# Patient Record
Sex: FEMALE | Race: WHITE | NOT HISPANIC OR LATINO
[De-identification: names, ages, dates, MRNs, and addresses within clinical notes are randomized per-mention and may not be internally consistent; named-entity substitution may affect disease eponyms.]

---

## 2024-10-09 ENCOUNTER — TRANSCRIPTION ENCOUNTER (OUTPATIENT)
Age: 34
End: 2024-10-09

## 2024-10-18 ENCOUNTER — ASOB RESULT (OUTPATIENT)
Age: 34
End: 2024-10-18

## 2024-10-18 ENCOUNTER — APPOINTMENT (OUTPATIENT)
Dept: ANTEPARTUM | Facility: CLINIC | Age: 34
End: 2024-10-18
Payer: COMMERCIAL

## 2024-10-18 PROCEDURE — 76819 FETAL BIOPHYS PROFIL W/O NST: CPT

## 2024-10-18 PROCEDURE — 76816 OB US FOLLOW-UP PER FETUS: CPT

## 2024-10-24 ENCOUNTER — APPOINTMENT (OUTPATIENT)
Dept: MATERNAL FETAL MEDICINE | Facility: CLINIC | Age: 34
End: 2024-10-24
Payer: COMMERCIAL

## 2024-10-24 ENCOUNTER — ASOB RESULT (OUTPATIENT)
Age: 34
End: 2024-10-24

## 2024-10-24 PROCEDURE — G0108 DIAB MANAGE TRN  PER INDIV: CPT | Mod: 95

## 2024-11-08 ENCOUNTER — INPATIENT (INPATIENT)
Facility: HOSPITAL | Age: 34
LOS: 2 days | Discharge: HOME | DRG: 951 | End: 2024-11-11
Attending: OBSTETRICS & GYNECOLOGY | Admitting: OBSTETRICS & GYNECOLOGY
Payer: COMMERCIAL

## 2024-11-08 VITALS
SYSTOLIC BLOOD PRESSURE: 143 MMHG | HEART RATE: 74 BPM | OXYGEN SATURATION: 98 % | RESPIRATION RATE: 18 BRPM | DIASTOLIC BLOOD PRESSURE: 69 MMHG

## 2024-11-08 DIAGNOSIS — Z34.80 ENCOUNTER FOR SUPERVISION OF OTHER NORMAL PREGNANCY, UNSPECIFIED TRIMESTER: ICD-10-CM

## 2024-11-08 DIAGNOSIS — O26.899 OTHER SPECIFIED PREGNANCY RELATED CONDITIONS, UNSPECIFIED TRIMESTER: ICD-10-CM

## 2024-11-08 DIAGNOSIS — Z3A.39 39 WEEKS GESTATION OF PREGNANCY: ICD-10-CM

## 2024-11-08 LAB
BASOPHILS # BLD AUTO: 0.02 K/UL — SIGNIFICANT CHANGE UP (ref 0–0.2)
BASOPHILS NFR BLD AUTO: 0.2 % — SIGNIFICANT CHANGE UP (ref 0–2)
BLD GP AB SCN SERPL QL: NEGATIVE — SIGNIFICANT CHANGE UP
EOSINOPHIL # BLD AUTO: 0.07 K/UL — SIGNIFICANT CHANGE UP (ref 0–0.5)
EOSINOPHIL NFR BLD AUTO: 0.6 % — SIGNIFICANT CHANGE UP (ref 0–6)
GLUCOSE BLDC GLUCOMTR-MCNC: 108 MG/DL — HIGH (ref 70–99)
GLUCOSE BLDC GLUCOMTR-MCNC: 116 MG/DL — HIGH (ref 70–99)
GLUCOSE BLDC GLUCOMTR-MCNC: 132 MG/DL — HIGH (ref 70–99)
GLUCOSE BLDC GLUCOMTR-MCNC: 71 MG/DL — SIGNIFICANT CHANGE UP (ref 70–99)
GLUCOSE BLDC GLUCOMTR-MCNC: 86 MG/DL — SIGNIFICANT CHANGE UP (ref 70–99)
GLUCOSE BLDC GLUCOMTR-MCNC: 90 MG/DL — SIGNIFICANT CHANGE UP (ref 70–99)
GLUCOSE BLDC GLUCOMTR-MCNC: 94 MG/DL — SIGNIFICANT CHANGE UP (ref 70–99)
HCT VFR BLD CALC: 36 % — SIGNIFICANT CHANGE UP (ref 34.5–45)
HGB BLD-MCNC: 11.8 G/DL — SIGNIFICANT CHANGE UP (ref 11.5–15.5)
IMM GRANULOCYTES NFR BLD AUTO: 0.6 % — SIGNIFICANT CHANGE UP (ref 0–0.9)
LYMPHOCYTES # BLD AUTO: 1.27 K/UL — SIGNIFICANT CHANGE UP (ref 1–3.3)
LYMPHOCYTES # BLD AUTO: 11.7 % — LOW (ref 13–44)
MCHC RBC-ENTMCNC: 30.7 PG — SIGNIFICANT CHANGE UP (ref 27–34)
MCHC RBC-ENTMCNC: 32.8 G/DL — SIGNIFICANT CHANGE UP (ref 32–36)
MCV RBC AUTO: 93.8 FL — SIGNIFICANT CHANGE UP (ref 80–100)
MONOCYTES # BLD AUTO: 0.65 K/UL — SIGNIFICANT CHANGE UP (ref 0–0.9)
MONOCYTES NFR BLD AUTO: 6 % — SIGNIFICANT CHANGE UP (ref 2–14)
NEUTROPHILS # BLD AUTO: 8.82 K/UL — HIGH (ref 1.8–7.4)
NEUTROPHILS NFR BLD AUTO: 80.9 % — HIGH (ref 43–77)
NRBC # BLD: 0 /100 WBCS — SIGNIFICANT CHANGE UP (ref 0–0)
PLATELET # BLD AUTO: 195 K/UL — SIGNIFICANT CHANGE UP (ref 150–400)
RBC # BLD: 3.84 M/UL — SIGNIFICANT CHANGE UP (ref 3.8–5.2)
RBC # FLD: 12.2 % — SIGNIFICANT CHANGE UP (ref 10.3–14.5)
RH IG SCN BLD-IMP: POSITIVE — SIGNIFICANT CHANGE UP
RH IG SCN BLD-IMP: POSITIVE — SIGNIFICANT CHANGE UP
T PALLIDUM AB TITR SER: NEGATIVE — SIGNIFICANT CHANGE UP
WBC # BLD: 10.89 K/UL — HIGH (ref 3.8–10.5)
WBC # FLD AUTO: 10.89 K/UL — HIGH (ref 3.8–10.5)

## 2024-11-08 RX ORDER — CHLORHEXIDINE GLUCONATE 40 MG/ML
1 SOLUTION TOPICAL DAILY
Refills: 0 | Status: DISCONTINUED | OUTPATIENT
Start: 2024-11-08 | End: 2024-11-09

## 2024-11-08 RX ORDER — AMPICILLIN 2 G/1
2 INJECTION, POWDER, FOR SOLUTION INTRAVENOUS ONCE
Refills: 0 | Status: COMPLETED | OUTPATIENT
Start: 2024-11-08 | End: 2024-11-08

## 2024-11-08 RX ORDER — CITRIC ACID/SODIUM CITRATE 334-500MG
15 SOLUTION, ORAL ORAL EVERY 6 HOURS
Refills: 0 | Status: DISCONTINUED | OUTPATIENT
Start: 2024-11-08 | End: 2024-11-09

## 2024-11-08 RX ORDER — OXYTOCIN IN D5W-0.2% SODIUM CL 15/250 ML
167 PLASTIC BAG, INJECTION (ML) INTRAVENOUS
Qty: 30 | Refills: 0 | Status: DISCONTINUED | OUTPATIENT
Start: 2024-11-08 | End: 2024-11-11

## 2024-11-08 RX ORDER — SODIUM CHLORIDE 9 MG/ML
1000 INJECTION, SOLUTION INTRAMUSCULAR; INTRAVENOUS; SUBCUTANEOUS
Refills: 0 | Status: DISCONTINUED | OUTPATIENT
Start: 2024-11-08 | End: 2024-11-09

## 2024-11-08 RX ORDER — OXYTOCIN IN D5W-0.2% SODIUM CL 15/250 ML
4 PLASTIC BAG, INJECTION (ML) INTRAVENOUS
Qty: 30 | Refills: 0 | Status: DISCONTINUED | OUTPATIENT
Start: 2024-11-08 | End: 2024-11-09

## 2024-11-08 RX ORDER — AMPICILLIN 2 G/1
1 INJECTION, POWDER, FOR SOLUTION INTRAVENOUS EVERY 4 HOURS
Refills: 0 | Status: DISCONTINUED | OUTPATIENT
Start: 2024-11-08 | End: 2024-11-09

## 2024-11-08 RX ADMIN — Medication 125 MILLILITER(S): at 05:01

## 2024-11-08 RX ADMIN — Medication 4 MILLIUNIT(S)/MIN: at 05:29

## 2024-11-08 RX ADMIN — Medication 1000 MILLILITER(S): at 09:20

## 2024-11-08 RX ADMIN — AMPICILLIN 108 GRAM(S): 2 INJECTION, POWDER, FOR SOLUTION INTRAVENOUS at 12:44

## 2024-11-08 RX ADMIN — AMPICILLIN 200 GRAM(S): 2 INJECTION, POWDER, FOR SOLUTION INTRAVENOUS at 08:45

## 2024-11-08 RX ADMIN — AMPICILLIN 108 GRAM(S): 2 INJECTION, POWDER, FOR SOLUTION INTRAVENOUS at 20:54

## 2024-11-08 RX ADMIN — AMPICILLIN 108 GRAM(S): 2 INJECTION, POWDER, FOR SOLUTION INTRAVENOUS at 16:45

## 2024-11-08 NOTE — OB PROVIDER H&P - NS_OBGYNHISTORY_OBGYN_ALL_OB_FT
– PNC: GDMA1, diet controlled. Fingersticks in normal range at home. GBS positive. EFW 3150g extrapolated from sono in office 3 weeks ago.   – OBHx: Primigravida. Denies ectopic pregnancies, terminations, miscarriages.  – GynHx: Denies fibroids, ovarian cysts, abnormal pap smears, STI

## 2024-11-08 NOTE — OB PROVIDER H&P - NS_FETALPRESSONO_OBGYN_ALL_OB
Would NOT use the antibiotic at home as this is likely to be viral.  If worsening or not improving after 7-10 days, please let us know.  For the eyes, would use the eye drops every 4 hours while awake for 5 days.  
Cephalic

## 2024-11-08 NOTE — OB PROVIDER H&P - HISTORY OF PRESENT ILLNESS
PA Admission H&P    Subjective  HPI: 34F  39.1wks gestational age presents for ROR. Pt states feeling a gush of fluid at 2:50am this morning and +FM. -CTXs. -VB. Pt denies any chest pain, palpitations, SOB, headaches, visual disturbances, RUQ pain, or any other concerns.    – PNC: GDMA1, diet controlled. Fingersticks in normal range at home. GBS positive. EFW 3150g extrapolated from sono in office 3 weeks ago.   – OBHx: Primigravida. Denies ectopic pregnancies, terminations, miscarriages.  – GynHx: Denies fibroids, ovarian cysts, abnormal pap smears, STI  – PMH: Denies asthma, thyroid disorders, renal/liver disease, bleeding/clotting disorders  – PSH: Open appendectomy.   – Psych: Denies anxiety, depression  – Social: Denies T/E/D  – Meds: PNV  – Allergies: NKDA  – Will accept blood transfusions? Yes

## 2024-11-08 NOTE — OB PROVIDER H&P - ASSESSMENT
Assessment  34F  39.1wks gestational age presents for ROR. Assessment  34F  39.1wks gestational age admitted for IOL for PROM at 2:50am.

## 2024-11-08 NOTE — OB PROVIDER H&P - NSLOWPPHRISK_OBGYN_A_OB
No previous uterine incision/[place] Pregnancy/Less than or equal to 4 previous vaginal births/No known bleeding disorder

## 2024-11-08 NOTE — OB PROVIDER H&P - NSHPREVIEWOFSYSTEMS_GEN_ALL_CORE
Pt states feeling a gush of fluid at 2:50am this morning and +FM. -CTXs. -VB. Pt denies any chest pain, palpitations, SOB, headaches, visual disturbances, RUQ pain, or any other concerns.

## 2024-11-08 NOTE — PRE-ANESTHESIA EVALUATION ADULT - NSANTHPMHFT_GEN_ALL_CORE
Gestational DM - diet controlled  35 y/o female in labor with ruptured membranes requesting epidural

## 2024-11-08 NOTE — OB PROVIDER H&P - PRO PRENATAL LABS ORI SOURCE VDRL/RPR
Abdomen soft, non-tender and non-distended, no rebound, no guarding and no masses. no hepatosplenomegaly. hard copy, drawn during this pregnancy

## 2024-11-08 NOTE — OB PROVIDER H&P - NSHPPHYSICALEXAM_GEN_ALL_CORE
Objective  – VS  T(C): 37.1 (11-08-24 @ 04:43)  HR: 70 (11-08-24 @ 05:07)  BP: 134/73 (11-08-24 @ 05:07)  RR: 18 (11-08-24 @ 04:43)  SpO2: 99% (11-08-24 @ 05:06)  – PE:   General: NAD  CV: RRR  Pulm: breathing comfortably on RA, clear to auscultation b/l  Abd: gravid, nontender  Extr: moving all extremities with ease, nonedematous, non-TTP of b/l LE  – VE: 1/75/-3, grossly ruptured with meconium, performed by Dr. Roy  – FHT: 135/moderate variability/+accels/-decels  – [place]: irregular contractions   – EFW: 3150g extrapolated from sono in office 3 weeks ago.  – Sono: vertex

## 2024-11-08 NOTE — OB PROVIDER H&P - PROBLEM SELECTOR PLAN 1
Plan  1. Admit to L&D. Routine Labs. IVF.  2. IOL with jjCB and pitocin  3. Fetus: cat 1 tracing. VTX. EFW 3150g extrapolated from sono in office 3 weeks ago. Continuous EFM. Sono. No concerns.  4. Prenatal issues: GDMA1, diet controlled (POC fingersticks per OB diabetic protocol).   5. GBS positive: Ampicillin for antibiotic prophylaxis  6. Pain: IV pain meds/epidural PRN    Discussed with Dr. Kirill Pederson PA-C

## 2024-11-08 NOTE — OB PROVIDER LABOR PROGRESS NOTE - ASSESSMENT
34F  39.1wks gestational age admitted for PROM at 2:30am.    -Cervical balloon placement discussed with pt, pt verbalized understanding and agreement, all questions answered  -Cook cervical balloon placed with 30cc NS in each intrauterine without complications, pt tolerated the procedure well   -Continue pitocin  -Continue to monitor EFM/toco    Danuta Pederson PA-C
35yo P0 @ 39w 1 d in active labor  - continue pitocin augmentation  - epidural in place  - GBS + --> continue ampicillin prophylaxis  - fetal stauts- cat 1  Allegra Urbano PA-C
35 y/o  @ 39.1 weeks admitted with PROM@3a, light meconium. Pregnancy c/w GDMA1  - IOL: s/p jjCB, pitocin currently infusing at 4 milliunits/min with adequate contraction frequency  -GBS positive: Ampicillin 1g Q4hrs  -anesthesia consult for pain control    d/w Dr. Kirill Gomez NP
35 y/o  @ 39.1 weeks admitted with PROM@3a, light meconium. Pregnancy c/b GDMA1  C/W Pitocin  C/W Amp for GBS ppx  D/W Dr. Kirill Nunez PAC

## 2024-11-08 NOTE — OB PROVIDER LABOR PROGRESS NOTE - NS_SUBJECTIVE/OBJECTIVE_OBGYN_ALL_OB_FT
NP Chart Note:    The patient was examined for further labor management s/p cervical balloon dislodgement.  The patient was noted to be 3/80/-3 grossly ruptured with light meconium fluid.  The patient is uncomfortable from contractions and requesting an epidural     ICU Vital Signs Last 24 Hrs  T(C): 36.7 (08 Nov 2024 07:34), Max: 37.1 (08 Nov 2024 04:36)  T(F): 98.06 (08 Nov 2024 07:34), Max: 98.8 (08 Nov 2024 04:43)  HR: 74 (08 Nov 2024 08:20) (67 - 89)  BP: 126/81 (08 Nov 2024 07:34) (126/81 - 143/69)  BP(mean): --  ABP: --  ABP(mean): --  RR: 16 (08 Nov 2024 07:34) (16 - 18)  SpO2: 99% (08 Nov 2024 08:20) (94% - 100%)    O2 Parameters below as of 08 Nov 2024 04:43  Patient On (Oxygen Delivery Method): room air
Pt evaluated at bedside for a cervical balloon placement. Pt comfortable in bed.    ICU Vital Signs Last 24 Hrs  T(C): 37.1 (08 Nov 2024 04:43), Max: 37.1 (08 Nov 2024 04:36)  T(F): 98.8 (08 Nov 2024 04:43), Max: 98.8 (08 Nov 2024 04:43)  HR: 78 (08 Nov 2024 06:02) (69 - 83)  BP: 129/75 (08 Nov 2024 05:23) (129/75 - 143/69)  RR: 18 (08 Nov 2024 04:43) (18 - 18)  SpO2: 98% (08 Nov 2024 06:02) (94% - 100%)    O2 Parameters below as of 08 Nov 2024 04:43  Patient On (Oxygen Delivery Method): room air
pt comfortable with epidural    T(C): 37.2 (11-08-24 @ 18:47), Max: 37.3 (11-08-24 @ 15:06)  HR: 83 (11-08-24 @ 19:21) (65 - 181)  BP: 102/64 (11-08-24 @ 19:17) (91/51 - 168/112)  RR: 18 (11-08-24 @ 18:47) (16 - 20)  SpO2: 96% (11-08-24 @ 19:21) (90% - 100%)
Pt examined to assess cervical change. Comfortable w/ epidural.

## 2024-11-08 NOTE — OB PROVIDER DELIVERY SUMMARY - NSSELHIDDEN_OBGYN_ALL_OB_FT
[NS_DeliveryAttending1_OBGYN_ALL_OB_FT:[email or web address]=] [NS_DeliveryAttending1_OBGYN_ALL_OB_FT:[email or web address]=],[NS_DeliveryAssist1_OBGYN_ALL_OB_FT:[id number]=]

## 2024-11-08 NOTE — OB RN TRIAGE NOTE - FALL HARM RISK - UNIVERSAL INTERVENTIONS
Bed in lowest position, wheels locked, appropriate side rails in place/Call bell, personal items and telephone in reach/Instruct patient to call for assistance before getting out of bed or chair/Non-slip footwear when patient is out of bed/[place] to call system/Physically safe environment - no spills, clutter or unnecessary equipment/Purposeful Proactive Rounding/Room/bathroom lighting operational, light cord in reach

## 2024-11-08 NOTE — OB PROVIDER DELIVERY SUMMARY - NSPROVIDERDELIVERYNOTE_OBGYN_ALL_OB_FT
pLTCS 2/2 arrest of descent, viable male infant delivered APGARS: 9/10. Position at delivery OP.   Baby weighs 3080g  Hysterotomy closed in 2 layers using caprosyn  Grossly normal uterus, tubes, and ovaries  Abdomen closed in standard fashion  Patient and infant to recovery in stable condition    EBL: 439cc  IVF: 1600cc  UOP: 75cc    Resident unavailable to scrub, so PA 1st assist  Dictation# pLTCS 2/2 arrest of descent, viable male infant delivered from the OP position  APGARS: 9/10, baby weighs 3080g  Hysterotomy closed in 2 layers using caprosyn  Grossly normal uterus, tubes, and ovaries  Abdomen closed in standard fashion  Patient and infant to recovery in stable condition    EBL: 439cc  IVF: 1600cc  UOP: 75cc    Resident unavailable to scrub, so PA 1st assist  Dictation #[id number]

## 2024-11-09 ENCOUNTER — TRANSCRIPTION ENCOUNTER (OUTPATIENT)
Age: 34
End: 2024-11-09

## 2024-11-09 PROCEDURE — 59514 CESAREAN DELIVERY ONLY: CPT | Mod: AS,U9

## 2024-11-09 RX ORDER — OXYCODONE HYDROCHLORIDE 30 MG/1
5 TABLET ORAL
Refills: 0 | Status: COMPLETED | OUTPATIENT
Start: 2024-11-09 | End: 2024-11-16

## 2024-11-09 RX ORDER — MAGNESIUM HYDROXIDE 1200 MG/15ML
30 SUSPENSION ORAL
Refills: 0 | Status: DISCONTINUED | OUTPATIENT
Start: 2024-11-09 | End: 2024-11-11

## 2024-11-09 RX ORDER — SIMETHICONE 80 MG/1
80 TABLET, CHEWABLE ORAL EVERY 4 HOURS
Refills: 0 | Status: DISCONTINUED | OUTPATIENT
Start: 2024-11-09 | End: 2024-11-11

## 2024-11-09 RX ORDER — OXYTOCIN IN D5W-0.2% SODIUM CL 15/250 ML
42 PLASTIC BAG, INJECTION (ML) INTRAVENOUS
Qty: 30 | Refills: 0 | Status: DISCONTINUED | OUTPATIENT
Start: 2024-11-09 | End: 2024-11-11

## 2024-11-09 RX ORDER — ONDANSETRON HYDROCHLORIDE 2 MG/ML
4 INJECTION, SOLUTION INTRAMUSCULAR; INTRAVENOUS EVERY 6 HOURS
Refills: 0 | Status: DISCONTINUED | OUTPATIENT
Start: 2024-11-09 | End: 2024-11-10

## 2024-11-09 RX ORDER — DIPHENHYDRAMINE HCL 12.5MG/5ML
25 ELIXIR ORAL EVERY 6 HOURS
Refills: 0 | Status: DISCONTINUED | OUTPATIENT
Start: 2024-11-09 | End: 2024-11-11

## 2024-11-09 RX ORDER — KETOROLAC TROMETHAMINE 30 MG/ML
30 INJECTION INTRAMUSCULAR; INTRAVENOUS EVERY 6 HOURS
Refills: 0 | Status: DISCONTINUED | OUTPATIENT
Start: 2024-11-09 | End: 2024-11-10

## 2024-11-09 RX ORDER — CEFAZOLIN SODIUM 1 G
2000 VIAL (EA) INJECTION EVERY 8 HOURS
Refills: 0 | Status: COMPLETED | OUTPATIENT
Start: 2024-11-09 | End: 2024-11-11

## 2024-11-09 RX ORDER — IBUPROFEN 200 MG
600 TABLET ORAL EVERY 6 HOURS
Refills: 0 | Status: COMPLETED | OUTPATIENT
Start: 2024-11-09 | End: 2025-10-08

## 2024-11-09 RX ORDER — ACETAMINOPHEN 500 MG
975 TABLET ORAL
Refills: 0 | Status: DISCONTINUED | OUTPATIENT
Start: 2024-11-09 | End: 2024-11-11

## 2024-11-09 RX ORDER — CEFAZOLIN SODIUM 1 G
VIAL (EA) INJECTION
Refills: 0 | Status: DISCONTINUED | OUTPATIENT
Start: 2024-11-09 | End: 2024-11-09

## 2024-11-09 RX ORDER — CLOSTRIDIUM TETANI TOXOID ANTIGEN (FORMALDEHYDE INACTIVATED), CORYNEBACTERIUM DIPHTHERIAE TOXOID ANTIGEN (FORMALDEHYDE INACTIVATED), BORDETELLA PERTUSSIS TOXOID ANTIGEN (GLUTARALDEHYDE INACTIVATED), BORDETELLA PERTUSSIS FILAMENTOUS HEMAGGLUTININ ANTIGEN (FORMALDEHYDE INACTIVATED), BORDETELLA PERTUSSIS PERTACTIN ANTIGEN, AND BORDETELLA PERTUSSIS FIMBRIAE 2/3 ANTIGEN 5; 2; 2.5; 5; 3; 5 [LF]/.5ML; [LF]/.5ML; UG/.5ML; UG/.5ML; UG/.5ML; UG/.5ML
0.5 INJECTION, SUSPENSION INTRAMUSCULAR ONCE
Refills: 0 | Status: DISCONTINUED | OUTPATIENT
Start: 2024-11-09 | End: 2024-11-11

## 2024-11-09 RX ORDER — CEFAZOLIN SODIUM 1 G
2000 VIAL (EA) INJECTION ONCE
Refills: 0 | Status: DISCONTINUED | OUTPATIENT
Start: 2024-11-09 | End: 2024-11-09

## 2024-11-09 RX ORDER — OXYCODONE HYDROCHLORIDE 30 MG/1
5 TABLET ORAL ONCE
Refills: 0 | Status: DISCONTINUED | OUTPATIENT
Start: 2024-11-09 | End: 2024-11-11

## 2024-11-09 RX ORDER — MODIFIED LANOLIN
1 OINTMENT (GRAM) TOPICAL EVERY 6 HOURS
Refills: 0 | Status: DISCONTINUED | OUTPATIENT
Start: 2024-11-09 | End: 2024-11-11

## 2024-11-09 RX ORDER — MORPHINE SULFATE 30 MG/1
2 TABLET, EXTENDED RELEASE ORAL ONCE
Refills: 0 | Status: DISCONTINUED | OUTPATIENT
Start: 2024-11-09 | End: 2024-11-10

## 2024-11-09 RX ORDER — NALBUPHINE HCL 100 %
2.5 POWDER (GRAM) MISCELLANEOUS EVERY 6 HOURS
Refills: 0 | Status: DISCONTINUED | OUTPATIENT
Start: 2024-11-09 | End: 2024-11-10

## 2024-11-09 RX ORDER — HEPARIN SODIUM 10000 [USP'U]/ML
5000 INJECTION INTRAVENOUS; SUBCUTANEOUS EVERY 12 HOURS
Refills: 0 | Status: DISCONTINUED | OUTPATIENT
Start: 2024-11-09 | End: 2024-11-11

## 2024-11-09 RX ORDER — DEXAMETHASONE 1.5 MG 1.5 MG/1
4 TABLET ORAL EVERY 6 HOURS
Refills: 0 | Status: DISCONTINUED | OUTPATIENT
Start: 2024-11-09 | End: 2024-11-10

## 2024-11-09 RX ORDER — NALOXONE HYDROCHLORIDE 1 MG/ML
0.1 INJECTION, SOLUTION INTRAMUSCULAR; INTRAVENOUS; SUBCUTANEOUS
Refills: 0 | Status: DISCONTINUED | OUTPATIENT
Start: 2024-11-09 | End: 2024-11-10

## 2024-11-09 RX ADMIN — Medication 975 MILLIGRAM(S): at 22:00

## 2024-11-09 RX ADMIN — Medication 100 MILLIGRAM(S): at 18:08

## 2024-11-09 RX ADMIN — KETOROLAC TROMETHAMINE 30 MILLIGRAM(S): 30 INJECTION INTRAMUSCULAR; INTRAVENOUS at 10:29

## 2024-11-09 RX ADMIN — KETOROLAC TROMETHAMINE 30 MILLIGRAM(S): 30 INJECTION INTRAMUSCULAR; INTRAVENOUS at 16:29

## 2024-11-09 RX ADMIN — AMPICILLIN 108 GRAM(S): 2 INJECTION, POWDER, FOR SOLUTION INTRAVENOUS at 00:37

## 2024-11-09 RX ADMIN — KETOROLAC TROMETHAMINE 30 MILLIGRAM(S): 30 INJECTION INTRAMUSCULAR; INTRAVENOUS at 23:56

## 2024-11-09 RX ADMIN — HEPARIN SODIUM 5000 UNIT(S): 10000 INJECTION INTRAVENOUS; SUBCUTANEOUS at 15:04

## 2024-11-09 RX ADMIN — Medication 975 MILLIGRAM(S): at 13:14

## 2024-11-09 RX ADMIN — Medication 975 MILLIGRAM(S): at 06:41

## 2024-11-09 RX ADMIN — Medication 975 MILLIGRAM(S): at 13:44

## 2024-11-09 RX ADMIN — Medication 975 MILLIGRAM(S): at 21:24

## 2024-11-09 RX ADMIN — Medication 100 MILLIGRAM(S): at 10:38

## 2024-11-09 RX ADMIN — ONDANSETRON HYDROCHLORIDE 4 MILLIGRAM(S): 2 INJECTION, SOLUTION INTRAMUSCULAR; INTRAVENOUS at 09:53

## 2024-11-09 RX ADMIN — KETOROLAC TROMETHAMINE 30 MILLIGRAM(S): 30 INJECTION INTRAMUSCULAR; INTRAVENOUS at 09:59

## 2024-11-09 NOTE — OB RN DELIVERY SUMMARY - NS_SEPSISRSKCALC_OBGYN_ALL_OB_FT
EOS calculated successfully. EOS Risk Factor: 0.5/1000 live births ([facility] national incidence); GA=39w2d; Temp=99.14; ROM=24.05; GBS='Positive'; Antibiotics='GBS specific antibiotics > 2 hrs prior to birth'

## 2024-11-09 NOTE — OB RN DELIVERY SUMMARY - NSSELHIDDEN_OBGYN_ALL_OB_FT
[NS_DeliveryAttending1_OBGYN_ALL_OB_FT:[email or web address]=],[NS_DeliveryAssist1_OBGYN_ALL_OB_FT:[id number]=],[NS_DeliveryRN_OBGYN_ALL_OB_FT:[email or web address]=]

## 2024-11-09 NOTE — OB NEONATOLOGY/PEDIATRICIAN DELIVERY SUMMARY - NSPEDSNEONOTESA_OBGYN_ALL_OB_FT
Peds NP in attendance at delivery as requested for meconium: 39.1 wk male born via primary, unscheduled C/S for failure to progress on  at 0303 to a 35 y/o  blood type A+ mother. No significant maternal history. Prenatal history of GDM diet controlled. PNL as follows: HIV -, Hep B -, Hep C neg, RPR NR, Rubella I, GBS +, treated with Amp x 5 doses. SROM at 0300 with meconium fluid. Baby emerged vigorous, crying, was warmed, dried, suctioned and stimulated with APGARS of 9/10. Mom plans to initiate breastfeeding. Consents to Hep B vaccine and declines circ.   Highest maternal temp 37.3. EOS 0.07.

## 2024-11-09 NOTE — OB RN INTRAOPERATIVE NOTE - NSSELHIDDEN_OBGYN_ALL_OB_FT
[NS_DeliveryAttending1_OBGYN_ALL_OB_FT:[email or web address]=],[NS_DeliveryAssist1_OBGYN_ALL_OB_FT:[id number]=],[NS_DeliveryRN_OBGYN_ALL_OB_FT:[email or web address]=]

## 2024-11-10 DIAGNOSIS — D62 ACUTE POSTHEMORRHAGIC ANEMIA: ICD-10-CM

## 2024-11-10 LAB
BASOPHILS # BLD AUTO: 0.03 K/UL — SIGNIFICANT CHANGE UP (ref 0–0.2)
BASOPHILS NFR BLD AUTO: 0.3 % — SIGNIFICANT CHANGE UP (ref 0–2)
EOSINOPHIL # BLD AUTO: 0.15 K/UL — SIGNIFICANT CHANGE UP (ref 0–0.5)
EOSINOPHIL NFR BLD AUTO: 1.3 % — SIGNIFICANT CHANGE UP (ref 0–6)
HCT VFR BLD CALC: 27.4 % — LOW (ref 34.5–45)
HGB BLD-MCNC: 8.8 G/DL — LOW (ref 11.5–15.5)
IMM GRANULOCYTES NFR BLD AUTO: 0.5 % — SIGNIFICANT CHANGE UP (ref 0–0.9)
LYMPHOCYTES # BLD AUTO: 1.09 K/UL — SIGNIFICANT CHANGE UP (ref 1–3.3)
LYMPHOCYTES # BLD AUTO: 9.5 % — LOW (ref 13–44)
MCHC RBC-ENTMCNC: 31.3 PG — SIGNIFICANT CHANGE UP (ref 27–34)
MCHC RBC-ENTMCNC: 32.1 G/DL — SIGNIFICANT CHANGE UP (ref 32–36)
MCV RBC AUTO: 97.5 FL — SIGNIFICANT CHANGE UP (ref 80–100)
MONOCYTES # BLD AUTO: 0.58 K/UL — SIGNIFICANT CHANGE UP (ref 0–0.9)
MONOCYTES NFR BLD AUTO: 5 % — SIGNIFICANT CHANGE UP (ref 2–14)
NEUTROPHILS # BLD AUTO: 9.61 K/UL — HIGH (ref 1.8–7.4)
NEUTROPHILS NFR BLD AUTO: 83.4 % — HIGH (ref 43–77)
NRBC # BLD: 0 /100 WBCS — SIGNIFICANT CHANGE UP (ref 0–0)
PLATELET # BLD AUTO: 162 K/UL — SIGNIFICANT CHANGE UP (ref 150–400)
RBC # BLD: 2.81 M/UL — LOW (ref 3.8–5.2)
RBC # FLD: 12.6 % — SIGNIFICANT CHANGE UP (ref 10.3–14.5)
WBC # BLD: 11.52 K/UL — HIGH (ref 3.8–10.5)
WBC # FLD AUTO: 11.52 K/UL — HIGH (ref 3.8–10.5)

## 2024-11-10 RX ORDER — ASCORBIC ACID 500 MG
500 TABLET ORAL DAILY
Refills: 0 | Status: DISCONTINUED | OUTPATIENT
Start: 2024-11-10 | End: 2024-11-11

## 2024-11-10 RX ORDER — OXYCODONE HYDROCHLORIDE 30 MG/1
5 TABLET ORAL
Refills: 0 | Status: DISCONTINUED | OUTPATIENT
Start: 2024-11-10 | End: 2024-11-11

## 2024-11-10 RX ORDER — FERROUS SULFATE 325(65) MG
325 TABLET ORAL
Refills: 0 | Status: DISCONTINUED | OUTPATIENT
Start: 2024-11-10 | End: 2024-11-11

## 2024-11-10 RX ORDER — IBUPROFEN 200 MG
600 TABLET ORAL EVERY 6 HOURS
Refills: 0 | Status: DISCONTINUED | OUTPATIENT
Start: 2024-11-10 | End: 2024-11-11

## 2024-11-10 RX ORDER — SENNA 187 MG
1 TABLET ORAL DAILY
Refills: 0 | Status: DISCONTINUED | OUTPATIENT
Start: 2024-11-10 | End: 2024-11-11

## 2024-11-10 RX ADMIN — HEPARIN SODIUM 5000 UNIT(S): 10000 INJECTION INTRAVENOUS; SUBCUTANEOUS at 15:37

## 2024-11-10 RX ADMIN — Medication 975 MILLIGRAM(S): at 21:57

## 2024-11-10 RX ADMIN — OXYCODONE HYDROCHLORIDE 5 MILLIGRAM(S): 30 TABLET ORAL at 09:25

## 2024-11-10 RX ADMIN — Medication 600 MILLIGRAM(S): at 19:17

## 2024-11-10 RX ADMIN — Medication 100 MILLIGRAM(S): at 18:17

## 2024-11-10 RX ADMIN — Medication 600 MILLIGRAM(S): at 13:07

## 2024-11-10 RX ADMIN — HEPARIN SODIUM 5000 UNIT(S): 10000 INJECTION INTRAVENOUS; SUBCUTANEOUS at 02:05

## 2024-11-10 RX ADMIN — Medication 975 MILLIGRAM(S): at 09:25

## 2024-11-10 RX ADMIN — Medication 600 MILLIGRAM(S): at 23:30

## 2024-11-10 RX ADMIN — Medication 600 MILLIGRAM(S): at 12:37

## 2024-11-10 RX ADMIN — Medication 975 MILLIGRAM(S): at 20:57

## 2024-11-10 RX ADMIN — KETOROLAC TROMETHAMINE 30 MILLIGRAM(S): 30 INJECTION INTRAMUSCULAR; INTRAVENOUS at 07:00

## 2024-11-10 RX ADMIN — Medication 100 MILLIGRAM(S): at 10:05

## 2024-11-10 RX ADMIN — Medication 600 MILLIGRAM(S): at 18:17

## 2024-11-10 RX ADMIN — Medication 100 MILLIGRAM(S): at 02:02

## 2024-11-10 RX ADMIN — Medication 1 TABLET(S): at 15:37

## 2024-11-10 RX ADMIN — Medication 500 MILLIGRAM(S): at 15:38

## 2024-11-10 RX ADMIN — KETOROLAC TROMETHAMINE 30 MILLIGRAM(S): 30 INJECTION INTRAMUSCULAR; INTRAVENOUS at 06:33

## 2024-11-10 RX ADMIN — Medication 975 MILLIGRAM(S): at 16:07

## 2024-11-10 RX ADMIN — Medication 975 MILLIGRAM(S): at 02:05

## 2024-11-10 RX ADMIN — Medication 975 MILLIGRAM(S): at 02:35

## 2024-11-10 RX ADMIN — Medication 975 MILLIGRAM(S): at 15:37

## 2024-11-10 RX ADMIN — KETOROLAC TROMETHAMINE 30 MILLIGRAM(S): 30 INJECTION INTRAMUSCULAR; INTRAVENOUS at 00:30

## 2024-11-10 RX ADMIN — Medication 325 MILLIGRAM(S): at 15:38

## 2024-11-10 RX ADMIN — Medication 975 MILLIGRAM(S): at 09:55

## 2024-11-10 NOTE — PROGRESS NOTE ADULT - SUBJECTIVE AND OBJECTIVE BOX
PA POD # 1 C/S Note    Blood Type:   A+          Rubella:  Immune                RPR:  NR    Pain:  Controlled  Complaints:  None  Pt. is ambulating, voiding, passing flatus, & tolerating regular diet.  Lochia:  WNL    VS:  T(C): 36.9 (11-10-24 @ 05:09), Max: 36.9 (11-09-24 @ 08:30)  HR: 94 (11-10-24 @ 05:09) (67 - 94)  BP: 97/63 (11-10-24 @ 05:09) (92/62 - 111/63)  RR: 18 (11-10-24 @ 05:09) (18 - 18)  SpO2: 95% (11-10-24 @ 05:09) (95% - 98%)                        8.8    11.52 )-----------( 162      ( 10 Nov 2024 05:32 )             27.4     Complete Blood Count + Automated Diff (11.08.24 @ 06:23)    WBC Count: 10.89 K/uL   Hemoglobin: 11.8 g/dL   Hematocrit: 36.0 %   Platelet Count - Automated: 195 K/uL    Abd:  Soft, non-distended, non-tender            Fundus-firm            Incision- C/D/I-SQ with Prineo  Extremities:  Edema - none                     Calf Tenderness - none    Assessment:    34 y.o. G 1 P 1001      POD # 1 S/P Primary C/S  for Failure to Descend, with anemia due to acute blood loss not requiring blood transfusion, in stable condition.    PMHx significant for:  Laparotomy/Appendectomy, GDMA1-controlled  Current Issues:  Anemia due to acute blood loss not requiring blood transfusion  Plan:  Increase OOB             Cont. Pain Protocol             Iron Supplementation             Cont. Reg. Diet             Cont. PO Care.            Cont. DVT PPx    HERMINIA Esparza

## 2024-11-10 NOTE — CHART NOTE - NSCHARTNOTEFT_GEN_A_CORE
Patient seen for: nutrition consult for GDM postpartum education prior to discharge    Source: patient, medical record     Patient is: ; GDM [A1]    Chart reviewed, events noted. Meds/Labs reviewed.    Anthropometrics per Patient Profile:  Height: 61 inches  Pre-pregnancy weight: 115 pounds   Weight gain: 23 pounds   Admit/Dosing wt: 138 pounds     Nutrition Diagnosis:   Food and Nutrition Related Knowledge Deficit related to limited previous exposure to postpartum GDM nutrition education and recommendations as evidenced by GDM postpartum.    Goal: Pt to state at least two teach back points.    Intervention:  1. Education: Pt educated on postpartum dietary recommendations, including risk of development of T2DM; reinforced importance of DM screening 4-12 weeks postpartum. Reviewed nutrition recommendations for breast feeding, including adequate protein, calorie, and fluid intake.   2. Handout: "What to expect now that you have had your baby"     Monitoring:  No other nutrition risks were identified during this encounter.  RD remains available upon request and will follow up per protocol.    Corinne Lima RDN, CDN - available via [place] TEAMS

## 2024-11-10 NOTE — PROGRESS NOTE ADULT - SUBJECTIVE AND OBJECTIVE BOX
Day 1 of Anesthesia Pain Management Service    SUBJECTIVE:  Pain Scale Score:          [X] Refer to charted pain scores    THERAPY:    s/p 2 mg PF morphine on 11/9      MEDICATIONS  (STANDING):  acetaminophen     Tablet .. 975 milliGRAM(s) Oral <User Schedule>  ascorbic acid 500 milliGRAM(s) Oral daily  ceFAZolin   IVPB 2000 milliGRAM(s) IV Intermittent every 8 hours  diphtheria/tetanus/pertussis (acellular) Vaccine (Adacel) 0.5 milliLiter(s) IntraMuscular once  ferrous    sulfate 325 milliGRAM(s) Oral two times a day  heparin   Injectable 5000 Unit(s) SubCutaneous every 12 hours  ibuprofen  Tablet. 600 milliGRAM(s) Oral every 6 hours  lactated ringers. 1000 milliLiter(s) (125 mL/Hr) IV Continuous <Continuous>  misoprostol 1000 MICROGram(s) Rectal once  oxytocin Infusion 42 milliUNIT(s)/Min (42 mL/Hr) IV Continuous <Continuous>  oxytocin Infusion 167 milliUNIT(s)/Min (167 mL/Hr) IV Continuous <Continuous>  senna 1 Tablet(s) Oral daily    MEDICATIONS  (PRN):  diphenhydrAMINE 25 milliGRAM(s) Oral every 6 hours PRN Pruritus  lanolin Ointment 1 Application(s) Topical every 6 hours PRN Sore Nipples  magnesium hydroxide Suspension 30 milliLiter(s) Oral two times a day PRN Constipation  oxyCODONE    IR 5 milliGRAM(s) Oral every 3 hours PRN Moderate to Severe Pain (4-10)  oxyCODONE    IR 5 milliGRAM(s) Oral once PRN Moderate to Severe Pain (4-10)  simethicone 80 milliGRAM(s) Chew every 4 hours PRN Gas      OBJECTIVE:    Sedation:        	[X] Alert	[ ] Drowsy	[ ] Arousable      [ ] Asleep       [ ] Unresponsive    Side Effects:	[X] None	[ ] Nausea	[ ] Vomiting         [ ] Pruritus  		[ ] Weakness            [ ] Numbness	          [ ] Other:    Vital Signs Last 24 Hrs  T(C): 36.9 (10 Nov 2024 05:09), Max: 36.9 (09 Nov 2024 21:06)  T(F): 98.5 (10 Nov 2024 05:09), Max: 98.5 (10 Nov 2024 05:09)  HR: 94 (10 Nov 2024 05:09) (77 - 94)  BP: 97/63 (10 Nov 2024 05:09) (92/62 - 104/65)  BP(mean): --  RR: 18 (10 Nov 2024 05:09) (18 - 18)  SpO2: 95% (10 Nov 2024 05:09) (95% - 98%)    Parameters below as of 10 Nov 2024 05:09  Patient On (Oxygen Delivery Method): room air        ASSESSMENT/ PLAN  [X] Patient transitioned to prn analgesics  [X] Pain management per primary service, pain service to sign off   [X]Documentation and Verification of current medications

## 2024-11-11 ENCOUNTER — TRANSCRIPTION ENCOUNTER (OUTPATIENT)
Age: 34
End: 2024-11-11

## 2024-11-11 VITALS
HEART RATE: 83 BPM | TEMPERATURE: 98 F | OXYGEN SATURATION: 95 % | DIASTOLIC BLOOD PRESSURE: 73 MMHG | RESPIRATION RATE: 17 BRPM | SYSTOLIC BLOOD PRESSURE: 117 MMHG

## 2024-11-11 PROCEDURE — 59050 FETAL MONITOR W/REPORT: CPT

## 2024-11-11 PROCEDURE — 85025 COMPLETE CBC W/AUTO DIFF WBC: CPT

## 2024-11-11 PROCEDURE — 82962 GLUCOSE BLOOD TEST: CPT

## 2024-11-11 PROCEDURE — 86850 RBC ANTIBODY SCREEN: CPT

## 2024-11-11 PROCEDURE — 86901 BLOOD TYPING SEROLOGIC RH(D): CPT

## 2024-11-11 PROCEDURE — 86780 TREPONEMA PALLIDUM: CPT

## 2024-11-11 PROCEDURE — 59025 FETAL NON-STRESS TEST: CPT

## 2024-11-11 PROCEDURE — 86900 BLOOD TYPING SEROLOGIC ABO: CPT

## 2024-11-11 PROCEDURE — 36415 COLL VENOUS BLD VENIPUNCTURE: CPT

## 2024-11-11 RX ORDER — PRENATAL VIT/IRON FUM/FOLIC AC 60 MG-1 MG
1 TABLET ORAL
Refills: 0 | DISCHARGE

## 2024-11-11 RX ORDER — IBUPROFEN 200 MG
1 TABLET ORAL
Qty: 0 | Refills: 0 | DISCHARGE
Start: 2024-11-11

## 2024-11-11 RX ORDER — ACETAMINOPHEN 500 MG
3 TABLET ORAL
Qty: 0 | Refills: 0 | DISCHARGE
Start: 2024-11-11

## 2024-11-11 RX ADMIN — Medication 975 MILLIGRAM(S): at 15:15

## 2024-11-11 RX ADMIN — Medication 600 MILLIGRAM(S): at 13:30

## 2024-11-11 RX ADMIN — Medication 600 MILLIGRAM(S): at 12:50

## 2024-11-11 RX ADMIN — Medication 600 MILLIGRAM(S): at 06:05

## 2024-11-11 RX ADMIN — Medication 975 MILLIGRAM(S): at 10:00

## 2024-11-11 RX ADMIN — Medication 975 MILLIGRAM(S): at 03:04

## 2024-11-11 RX ADMIN — Medication 600 MILLIGRAM(S): at 00:30

## 2024-11-11 RX ADMIN — Medication 975 MILLIGRAM(S): at 09:07

## 2024-11-11 RX ADMIN — Medication 100 MILLIGRAM(S): at 02:05

## 2024-11-11 RX ADMIN — HEPARIN SODIUM 5000 UNIT(S): 10000 INJECTION INTRAVENOUS; SUBCUTANEOUS at 06:05

## 2024-11-11 RX ADMIN — Medication 325 MILLIGRAM(S): at 06:07

## 2024-11-11 RX ADMIN — Medication 975 MILLIGRAM(S): at 02:04

## 2024-11-11 NOTE — DISCHARGE NOTE OB - FINANCIAL ASSISTANCE
[facility] provides services at a reduced cost to those who are determined to be eligible through [facility]’s financial assistance program. Information regarding [facility]’s financial assistance program can be found by going to [email or web address] or by calling [phone].

## 2024-11-11 NOTE — PROGRESS NOTE ADULT - ASSESSMENT
34y POD#2 s/p pLTCS, doing well.   
 34 y.o. G 1 P 1001      POD # 1 S/P Primary C/S  for Failure to Descend, with anemia due to acute blood loss not requiring blood transfusion, in stable condition.

## 2024-11-11 NOTE — DISCHARGE NOTE OB - CARE PROVIDER_API CALL
Miroslava Roy  Obstetrics and Gynecology  [address]  [place] [address]  Phone: [phone]  Fax: [phone]  Follow Up Time:

## 2024-11-11 NOTE — DISCHARGE NOTE OB - CARE PLAN
Principal Discharge DX:	 delivery delivered  Assessment and plan of treatment:	Return to baseline health, regular diet, follow up with dr meredith   1

## 2024-11-11 NOTE — PROGRESS NOTE ADULT - SUBJECTIVE AND OBJECTIVE BOX
Postpartum Note-  Section POD#2    Allergies: No Known Allergies    Blood type: A positive   Rubella: Immune  RPR: Negative      S: Patient is a 34y  POD#2 s/p pLTCS.   Patient w/o complaints, pain is controlled.  Pt is OOB, tolerating PO, passing flatus, and voiding. Lochia WNL.     O:  Vital Signs Last 24 Hrs  T(C): 36.4 (2024 05:42), Max: 36.9 (10 Nov 2024 09:00)  T(F): 97.6 (2024 05:42), Max: 98.4 (10 Nov 2024 09:00)  HR: 59 (2024 05:42) (59 - 90)  BP: 118/79 (2024 05:42) (100/64 - 121/75)  BP(mean): --  RR: 18 (2024 05:42) (17 - 18)  SpO2: 96% (2024 05:42) (94% - 96%)    Parameters below as of 2024 05:42  Patient On (Oxygen Delivery Method): room air      Gen: NAD  Abdomen: Soft, nontender, non distended, fundus firm.  Incision: Clean, dry, and intact. Negative erythema/edema/ecchymosis.  SubQ  Lochia WNL  Ext: Neg edema, Neg calf tenderness    LABS:                          8.8    11.52 )-----------( 162      ( 10 Nov 2024 05:32 )             27.4       A/P:  34y POD#2 s/p pLTCS, doing well.       PMHx: Denies  Current Issues: Anemia due to acute blood loss-pt asx and VSS    Increase OOB  C/w Iron/Vitamin C  PO Pain Protocol  Continue Regular Diet  Continue routine post op care    Arlene Conley PA-C

## 2024-11-11 NOTE — PROGRESS NOTE ADULT - PROBLEM SELECTOR PLAN 2
C/w Iron/Vitamin C  Continue to monitor for signs/symptoms of anemia
Monitor for S/Sx anemia  Iron Supplementation